# Patient Record
Sex: MALE | Race: WHITE | NOT HISPANIC OR LATINO | Employment: FULL TIME | URBAN - METROPOLITAN AREA
[De-identification: names, ages, dates, MRNs, and addresses within clinical notes are randomized per-mention and may not be internally consistent; named-entity substitution may affect disease eponyms.]

---

## 2017-02-27 ENCOUNTER — HOSPITAL ENCOUNTER (EMERGENCY)
Facility: HOSPITAL | Age: 67
Discharge: STILL ADMITTED OR ADMINISTRATIVE | End: 2017-02-27
Attending: FAMILY MEDICINE
Payer: COMMERCIAL

## 2017-02-27 VITALS
HEIGHT: 67 IN | RESPIRATION RATE: 20 BRPM | WEIGHT: 156 LBS | OXYGEN SATURATION: 99 % | TEMPERATURE: 99 F | HEART RATE: 90 BPM | DIASTOLIC BLOOD PRESSURE: 62 MMHG | BODY MASS INDEX: 24.48 KG/M2 | SYSTOLIC BLOOD PRESSURE: 105 MMHG

## 2017-02-27 DIAGNOSIS — D69.6 THROMBOCYTOPENIA: ICD-10-CM

## 2017-02-27 DIAGNOSIS — R07.9 CHEST PAIN: Primary | ICD-10-CM

## 2017-02-27 DIAGNOSIS — D61.82 MYELOPHTHISIC ANEMIA: ICD-10-CM

## 2017-02-27 LAB
ALBUMIN SERPL BCP-MCNC: 4.1 G/DL
ALP SERPL-CCNC: 113 IU/L
ALT SERPL W/O P-5'-P-CCNC: 19 IU/L
ANION GAP SERPL CALC-SCNC: 11 MMOL/L
ANISOCYTOSIS BLD QL SMEAR: SLIGHT
AST SERPL-CCNC: 27 IU/L
BASOPHILS NFR BLD: 0 %
BILIRUB SERPL-MCNC: 1 MG/DL
BUN SERPL-MCNC: 19 MG/DL
CALCIUM SERPL-MCNC: 9.2 MG/DL
CHLORIDE SERPL-SCNC: 101 MMOL/L
CO2 SERPL-SCNC: 27 MMOL/L
CREAT SERPL-MCNC: 0.8 MG/DL
DIFFERENTIAL METHOD: ABNORMAL
EOSINOPHIL NFR BLD: 1 %
ERYTHROCYTE [DISTWIDTH] IN BLOOD BY AUTOMATED COUNT: 16.2 %
EST. GFR  (AFRICAN AMERICAN): >60 ML/MIN/1.73 M^2
EST. GFR  (NON AFRICAN AMERICAN): >60 ML/MIN/1.73 M^2
GLUCOSE SERPL-MCNC: 110 MG/DL
HCT VFR BLD AUTO: 22.9 %
HGB BLD-MCNC: 8 G/DL
HYPOCHROMIA BLD QL SMEAR: ABNORMAL
LYMPHOCYTES NFR BLD: 66 %
MCH RBC QN AUTO: 31.7 PG
MCHC RBC AUTO-ENTMCNC: 34.9 %
MCV RBC AUTO: 91 FL
METAMYELOCYTES NFR BLD MANUAL: 1 %
MONOCYTES NFR BLD: 21 %
MYELOCYTES NFR BLD MANUAL: 10 %
NEUTROPHILS NFR BLD: 1 %
PLATELET # BLD AUTO: 3 K/UL
PLATELET BLD QL SMEAR: ABNORMAL
PMV BLD AUTO: ABNORMAL FL
POTASSIUM SERPL-SCNC: 3.5 MMOL/L
PROT SERPL-MCNC: 8.5 G/DL
RBC # BLD AUTO: 2.52 M/UL
SODIUM SERPL-SCNC: 139 MMOL/L
TROPONIN I SERPL DL<=0.01 NG/ML-MCNC: <0.012 NG/ML
WBC # BLD AUTO: 5.18 K/UL

## 2017-02-27 PROCEDURE — 84484 ASSAY OF TROPONIN QUANT: CPT

## 2017-02-27 PROCEDURE — 80053 COMPREHEN METABOLIC PANEL: CPT

## 2017-02-27 PROCEDURE — 25000003 PHARM REV CODE 250: Performed by: FAMILY MEDICINE

## 2017-02-27 PROCEDURE — 85007 BL SMEAR W/DIFF WBC COUNT: CPT

## 2017-02-27 PROCEDURE — 99285 EMERGENCY DEPT VISIT HI MDM: CPT

## 2017-02-27 PROCEDURE — 85027 COMPLETE CBC AUTOMATED: CPT

## 2017-02-27 RX ORDER — SAW PALMETTO 160 MG
320 CAPSULE ORAL 2 TIMES DAILY
COMMUNITY

## 2017-02-27 RX ORDER — ATORVASTATIN CALCIUM 40 MG/1
40 TABLET, FILM COATED ORAL DAILY
COMMUNITY

## 2017-02-27 RX ORDER — ACETAMINOPHEN 500 MG
1 TABLET ORAL DAILY
COMMUNITY

## 2017-02-27 RX ADMIN — LIDOCAINE HYDROCHLORIDE: 20 SOLUTION ORAL; TOPICAL at 04:02

## 2017-02-27 NOTE — ED PROVIDER NOTES
Encounter Date: 2/27/2017       History     Chief Complaint   Patient presents with    Chest Pain     Pt reports midsternal chest pain that started this morning. Pt denies nausea. Pt reports SOB for a couple of weeks     Review of patient's allergies indicates:  No Known Allergies  HPI Comments: The patient's a 66-year-old white male comes in complaining of nonradiating midsternal chest pain/tightness that began shortly after 8 AM this morning as he arose to go to have outpatient labs drawn.  The patient has a recent diagnosis of myelodysplastic disease and was going for his follow-up lab studies.  Apparently the patient's platelet count  dropped to 5000 a couple weeks ago and he received a platelet transfusion at that time.  The patient states he had a history of hypertension in the past but since he is lost weight his blood pressures been normal.  He has a history of hyperlipidemia.  He's had no history of angina or coronary disease.  The patient states he was slightly short of breath but states she's been short of breath especially with exertion for several months.  He had no nausea diaphoresis.    The history is provided by the patient.     Past Medical History:   Diagnosis Date    High cholesterol     MDS (myelodysplastic syndrome)      No past surgical history on file.  No family history on file.  Social History   Substance Use Topics    Smoking status: Never Smoker    Smokeless tobacco: Not on file    Alcohol use No     Review of Systems   Constitutional: Negative for fever.   Respiratory: Positive for chest tightness and shortness of breath. Negative for cough.    Cardiovascular: Positive for chest pain.   Gastrointestinal: Negative for abdominal pain and nausea.   Skin: Positive for pallor.   All other systems reviewed and are negative.      Physical Exam   Initial Vitals   BP Pulse Resp Temp SpO2   02/27/17 1456 02/27/17 1456 02/27/17 1456 02/27/17 1456 02/27/17 1456   177/74 90 20 98.2 °F (36.8 °C)  100 %     Physical Exam    Nursing note and vitals reviewed.  Constitutional: He appears well-developed. No distress.   Obese black infant with increased work of breathing   HENT:   Head: Normocephalic.   Eyes: EOM are normal. Pupils are equal, round, and reactive to light.   Neck: Neck supple.   Cardiovascular: Normal rate, regular rhythm and normal heart sounds. Exam reveals no friction rub.    No murmur heard.  Pulmonary/Chest: No respiratory distress. He has no wheezes.   Abdominal: Soft. He exhibits no distension.   Musculoskeletal: Normal range of motion.   Neurological: He is alert and oriented to person, place, and time.   Skin: Skin is warm. There is pallor.   Psychiatric: He has a normal mood and affect.         ED Course   Procedures  Labs Reviewed   CBC W/ AUTO DIFFERENTIAL - Abnormal; Notable for the following:        Result Value    RBC 2.52 (*)     Hemoglobin 8.0 (*)     Hematocrit 22.9 (*)     MCH 31.7 (*)     RDW 16.2 (*)     Platelets 3 (*)     Gran% 1.0 (*)     Lymph% 66.0 (*)     Mono% 21.0 (*)     Platelet Estimate Decreased (*)     All other components within normal limits    Narrative:     PLT   critical result(s) called and verbal readback obtained from   Patria Guerrero in ED, 02/27/2017 15:45   COMPREHENSIVE METABOLIC PANEL - Abnormal; Notable for the following:     Total Protein 8.5 (*)     All other components within normal limits   TROPONIN I        ECG Results         EKG 12-lead (Reason:chest pain) (Final result) Result time:  02/27/17 16:11:03    Final result by Interface, Lab In [facility] (02/27/17 16:11:03)    Narrative:    Test Reason : r07.9  Blood Pressure :  mmHG  Vent. Rate : 086 BPM     Atrial Rate : 086 BPM     P-R Int : 148 ms          QRS Dur : 094 ms      QT Int : 372 ms       P-R-T Axes : 045 -10 006 degrees     QTc Int : 445 ms    Normal sinus rhythm  Normal ECG  No previous ECGs available  Confirmed by Rambo SHEPPARD, Ace BOSS ([id number]) on 2/27/2017 4:10:48 PM    Referred By:  SELF REFERRAL           Confirmed By:Ace Ricks MD               Medical Decision Making:   Clinical Tests:   Lab Tests: Ordered and Reviewed       <> Summary of Lab: Platelets 3K.  H&H 8/23.  Troponin negative  Radiological Study: Ordered and Reviewed  Medical Tests: Ordered and Reviewed  ED Management:  Chest x-ray shows some fibrotic change normal heart size no acute infiltrates or pulmonary edema KG normal sinus rhythm.  Troponin negative.  Patient's shortness of breath may be due to his anemia.  Spoke with the patient's oncologist hematologist, Dr. Alexandre, who recommended we admit the patient for observation and platelet transfusion and possible red cell transfusions since this is [facility] and infusion center will not be open for over 48 hours.  Additionally serial enzymes and EKGs can be done at the same time to rule out coronary.  Other:   I have discussed this case with another health care provider.       <> Summary of the Discussion: Discussed case with Dr. Alexandre as above and with the hospitalist at [facility], Dr. Coelho, who graciously accepted the patient for admission.                   ED Course     Clinical Impression:   The primary encounter diagnosis was Chest pain. 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          WALTER Main MD  02/27/17 [id number]

## 2017-02-27 NOTE — ED NOTES
Spoke to Magali from formerly [facility] transfer line regarding need for admission per DR. Alexandre. Magali states will work on getting a admitting physician and call me back

## 2017-02-27 NOTE — ED TRIAGE NOTES
Pt reports midsternal chest pain that started this morning. Pt denies nausea. Pt reports SOB for a couple of weeks